# Patient Record
Sex: FEMALE | Race: WHITE | Employment: OTHER | URBAN - METROPOLITAN AREA
[De-identification: names, ages, dates, MRNs, and addresses within clinical notes are randomized per-mention and may not be internally consistent; named-entity substitution may affect disease eponyms.]

---

## 2017-06-09 PROCEDURE — 82746 ASSAY OF FOLIC ACID SERUM: CPT | Performed by: PHYSICIAN ASSISTANT

## 2017-06-09 PROCEDURE — 82607 VITAMIN B-12: CPT | Performed by: PHYSICIAN ASSISTANT

## 2017-06-09 PROCEDURE — 88175 CYTOPATH C/V AUTO FLUID REDO: CPT | Performed by: PHYSICIAN ASSISTANT

## 2017-06-09 PROCEDURE — 87624 HPV HI-RISK TYP POOLED RSLT: CPT | Performed by: PHYSICIAN ASSISTANT

## 2022-10-10 NOTE — TELEPHONE ENCOUNTER
Last seen 9/16/22     Return in about 1 month (around 10/16/2022) for anxiety follow up .     appt 10/18/22

## 2022-12-21 NOTE — TELEPHONE ENCOUNTER
Last OV: 10/18/22 f/u anxiety    Future Appointments   Date Time Provider Manjula Patterson   1/16/2023  1:40 PM Anya Luther PA-C [facility]        Latest labs: last labs was 2017. Labs ordered to complete for this year    Latest RX: sertraline- 30 tabs 1 refill on 10/10/22    Per protocol, not on protocol. Rx pending.

## 2023-01-30 ENCOUNTER — OFFICE VISIT (OUTPATIENT)
Dept: INTERNAL MEDICINE CLINIC | Facility: CLINIC | Age: 43
End: 2023-01-30
Payer: COMMERCIAL

## 2023-01-30 VITALS
OXYGEN SATURATION: 99 % | SYSTOLIC BLOOD PRESSURE: 102 MMHG | WEIGHT: 139.81 LBS | RESPIRATION RATE: 18 BRPM | HEART RATE: 58 BPM | DIASTOLIC BLOOD PRESSURE: 66 MMHG | HEIGHT: 64 IN | BODY MASS INDEX: 23.87 KG/M2

## 2023-01-30 DIAGNOSIS — F41.9 ANXIETY: ICD-10-CM

## 2023-01-30 DIAGNOSIS — Z12.31 ENCOUNTER FOR SCREENING MAMMOGRAM FOR MALIGNANT NEOPLASM OF BREAST: ICD-10-CM

## 2023-01-30 DIAGNOSIS — Z00.00 ROUTINE GENERAL MEDICAL EXAMINATION AT A HEALTH CARE FACILITY: Primary | ICD-10-CM

## 2023-01-30 DIAGNOSIS — R17 ELEVATED BILIRUBIN: ICD-10-CM

## 2023-01-30 DIAGNOSIS — E78.00 PURE HYPERCHOLESTEROLEMIA: ICD-10-CM

## 2023-01-30 PROCEDURE — 3074F SYST BP LT 130 MM HG: CPT | Performed by: PHYSICIAN ASSISTANT

## 2023-01-30 PROCEDURE — 3078F DIAST BP <80 MM HG: CPT | Performed by: PHYSICIAN ASSISTANT

## 2023-01-30 PROCEDURE — 99396 PREV VISIT EST AGE 40-64: CPT | Performed by: PHYSICIAN ASSISTANT

## 2023-01-30 PROCEDURE — 3008F BODY MASS INDEX DOCD: CPT | Performed by: PHYSICIAN ASSISTANT

## 2023-09-12 LAB
ALBUMIN/GLOBULIN RATIO: 1.7 (CALC) (ref 1–2.5)
ALBUMIN: 4.3 G/DL (ref 3.6–5.1)
ALKALINE PHOSPHATASE: 56 U/L (ref 31–125)
ALT: 15 U/L (ref 6–29)
AST: 17 U/L (ref 10–30)
BILIRUBIN, DIRECT: 0.1 MG/DL
BILIRUBIN, INDIRECT: 0.7 MG/DL (CALC) (ref 0.2–1.2)
BILIRUBIN, TOTAL: 0.8 MG/DL (ref 0.2–1.2)
CHOL/HDLC RATIO: 4.9 (CALC)
CHOLESTEROL, TOTAL: 216 MG/DL
GLOBULIN: 2.5 G/DL (CALC) (ref 1.9–3.7)
HDL CHOLESTEROL: 44 MG/DL
LDL-CHOLESTEROL: 146 MG/DL (CALC)
NON-HDL CHOLESTEROL: 172 MG/DL (CALC)
PROTEIN, TOTAL: 6.8 G/DL (ref 6.1–8.1)
TRIGLYCERIDES: 133 MG/DL

## 2023-09-20 ENCOUNTER — TELEPHONE (OUTPATIENT)
Dept: INTERNAL MEDICINE CLINIC | Facility: CLINIC | Age: 43
End: 2023-09-20

## 2023-09-20 NOTE — TELEPHONE ENCOUNTER
Future Appointments   Date Time Provider Manjula Yesenia   9/26/2023  8:40 AM Consuelo Murray., MICHAEL [facility]     Since patient received a message from CS on her lab results, does CS still want to see patient? ?

## 2023-09-20 NOTE — TELEPHONE ENCOUNTER
Which pharmacy:[facility]    Prescription Refill Request - Patient advised can take 48-72 hours. Name of Medication (strength, dose, qty requested:    Disp Refills Start End    sertraline 50 MG Oral Tab 30 tablet 0 8/4/2023     Sig - Route: TAKE 1 TABLET BY MOUTH EVERY DAY - Oral      Patient only has 1 left.

## 2023-09-25 NOTE — TELEPHONE ENCOUNTER
Pt is calling to check on the status she is completely out for a few days. She is scheduled for   Future Appointments   Date Time Provider Manjula Patterson   9/26/2023  8:40 AM Thomas Pickett PA-C [facility]      Is CS wanting her to wait till the appt?

## 2023-09-25 NOTE — TELEPHONE ENCOUNTER
Last OV: 1/30/23    Future Appointments   Date Time Provider Manjula Harrisoni   9/26/2023  8:40 AM Anny Dumont PA-C [facility]        Latest labs: 9/11/23 Lipid and Hepatic function    Latest RX:    sertraline 50 MG Oral Tab 30 tabs 0 refills on 8/4/23       Per protocol, not on protocol. Rx pending.

## 2024-07-16 DIAGNOSIS — Z12.31 VISIT FOR SCREENING MAMMOGRAM: ICD-10-CM
